# Patient Record
Sex: MALE | Race: WHITE | NOT HISPANIC OR LATINO | Employment: FULL TIME | URBAN - METROPOLITAN AREA
[De-identification: names, ages, dates, MRNs, and addresses within clinical notes are randomized per-mention and may not be internally consistent; named-entity substitution may affect disease eponyms.]

---

## 2020-01-15 ENCOUNTER — TRANSCRIBE ORDERS (OUTPATIENT)
Dept: CARDIOLOGY | Facility: CLINIC | Age: 40
End: 2020-01-15

## 2020-01-15 DIAGNOSIS — R94.39 ABNORMAL STRESS TEST: Primary | ICD-10-CM

## 2020-01-21 ENCOUNTER — HOSPITAL ENCOUNTER (OUTPATIENT)
Facility: HOSPITAL | Age: 40
Discharge: HOME | End: 2020-01-21
Attending: INTERNAL MEDICINE | Admitting: INTERNAL MEDICINE

## 2020-01-21 VITALS
HEIGHT: 73 IN | SYSTOLIC BLOOD PRESSURE: 106 MMHG | OXYGEN SATURATION: 97 % | TEMPERATURE: 97.8 F | WEIGHT: 200.62 LBS | BODY MASS INDEX: 26.59 KG/M2 | RESPIRATION RATE: 18 BRPM | DIASTOLIC BLOOD PRESSURE: 78 MMHG | HEART RATE: 80 BPM

## 2020-01-21 DIAGNOSIS — R94.39 ABNORMAL STRESS TEST: ICD-10-CM

## 2020-01-21 LAB
ALBUMIN SERPL-MCNC: 4.5 G/DL (ref 3.5–5.2)
ALBUMIN/GLOB SERPL: 1.7 G/DL
ALP SERPL-CCNC: 94 U/L (ref 39–117)
ALT SERPL W P-5'-P-CCNC: 22 U/L (ref 1–41)
ANION GAP SERPL CALCULATED.3IONS-SCNC: 10 MMOL/L (ref 5–15)
AST SERPL-CCNC: 20 U/L (ref 1–40)
BILIRUB SERPL-MCNC: 0.4 MG/DL (ref 0.2–1.2)
BUN BLD-MCNC: 7 MG/DL (ref 6–20)
BUN/CREAT SERPL: 5.9 (ref 7–25)
CALCIUM SPEC-SCNC: 9.5 MG/DL (ref 8.6–10.5)
CHLORIDE SERPL-SCNC: 99 MMOL/L (ref 98–107)
CHOLEST SERPL-MCNC: 212 MG/DL (ref 0–200)
CO2 SERPL-SCNC: 30 MMOL/L (ref 22–29)
CREAT BLD-MCNC: 1.18 MG/DL (ref 0.76–1.27)
DEPRECATED RDW RBC AUTO: 43.9 FL (ref 37–54)
ERYTHROCYTE [DISTWIDTH] IN BLOOD BY AUTOMATED COUNT: 12.9 % (ref 12.3–15.4)
GFR SERPL CREATININE-BSD FRML MDRD: 68 ML/MIN/1.73
GLOBULIN UR ELPH-MCNC: 2.7 GM/DL
GLUCOSE BLD-MCNC: 97 MG/DL (ref 65–99)
HBA1C MFR BLD: 5.5 % (ref 4.8–5.6)
HCT VFR BLD AUTO: 50.5 % (ref 37.5–51)
HDLC SERPL-MCNC: 41 MG/DL (ref 40–60)
HGB BLD-MCNC: 17.4 G/DL (ref 13–17.7)
LDLC SERPL CALC-MCNC: 140 MG/DL (ref 0–100)
LDLC/HDLC SERPL: 3.4 {RATIO}
MCH RBC QN AUTO: 32.5 PG (ref 26.6–33)
MCHC RBC AUTO-ENTMCNC: 34.5 G/DL (ref 31.5–35.7)
MCV RBC AUTO: 94.2 FL (ref 79–97)
PLATELET # BLD AUTO: 260 10*3/MM3 (ref 140–450)
PMV BLD AUTO: 9.2 FL (ref 6–12)
POTASSIUM BLD-SCNC: 4.2 MMOL/L (ref 3.5–5.2)
PROT SERPL-MCNC: 7.2 G/DL (ref 6–8.5)
RBC # BLD AUTO: 5.36 10*6/MM3 (ref 4.14–5.8)
SODIUM BLD-SCNC: 139 MMOL/L (ref 136–145)
TRIGL SERPL-MCNC: 157 MG/DL (ref 0–150)
VLDLC SERPL-MCNC: 31.4 MG/DL
WBC NRBC COR # BLD: 7.93 10*3/MM3 (ref 3.4–10.8)

## 2020-01-21 PROCEDURE — 83036 HEMOGLOBIN GLYCOSYLATED A1C: CPT | Performed by: NURSE PRACTITIONER

## 2020-01-21 PROCEDURE — 80053 COMPREHEN METABOLIC PANEL: CPT | Performed by: NURSE PRACTITIONER

## 2020-01-21 PROCEDURE — S0260 H&P FOR SURGERY: HCPCS | Performed by: INTERNAL MEDICINE

## 2020-01-21 PROCEDURE — 0 IOPAMIDOL PER 1 ML: Performed by: INTERNAL MEDICINE

## 2020-01-21 PROCEDURE — 93458 L HRT ARTERY/VENTRICLE ANGIO: CPT | Performed by: INTERNAL MEDICINE

## 2020-01-21 PROCEDURE — 25010000002 MIDAZOLAM PER 1 MG: Performed by: INTERNAL MEDICINE

## 2020-01-21 PROCEDURE — 25010000002 HEPARIN (PORCINE) PER 1000 UNITS: Performed by: INTERNAL MEDICINE

## 2020-01-21 PROCEDURE — 85027 COMPLETE CBC AUTOMATED: CPT | Performed by: NURSE PRACTITIONER

## 2020-01-21 PROCEDURE — 80061 LIPID PANEL: CPT | Performed by: NURSE PRACTITIONER

## 2020-01-21 PROCEDURE — 25010000002 FENTANYL CITRATE (PF) 100 MCG/2ML SOLUTION: Performed by: INTERNAL MEDICINE

## 2020-01-21 PROCEDURE — C1894 INTRO/SHEATH, NON-LASER: HCPCS | Performed by: INTERNAL MEDICINE

## 2020-01-21 PROCEDURE — C1769 GUIDE WIRE: HCPCS | Performed by: INTERNAL MEDICINE

## 2020-01-21 PROCEDURE — 36415 COLL VENOUS BLD VENIPUNCTURE: CPT

## 2020-01-21 RX ORDER — ONDANSETRON 2 MG/ML
4 INJECTION INTRAMUSCULAR; INTRAVENOUS EVERY 6 HOURS PRN
Status: DISCONTINUED | OUTPATIENT
Start: 2020-01-21 | End: 2020-01-21 | Stop reason: HOSPADM

## 2020-01-21 RX ORDER — LIDOCAINE HYDROCHLORIDE 10 MG/ML
INJECTION, SOLUTION EPIDURAL; INFILTRATION; INTRACAUDAL; PERINEURAL AS NEEDED
Status: DISCONTINUED | OUTPATIENT
Start: 2020-01-21 | End: 2020-01-21 | Stop reason: HOSPADM

## 2020-01-21 RX ORDER — MIDAZOLAM HYDROCHLORIDE 1 MG/ML
INJECTION INTRAMUSCULAR; INTRAVENOUS AS NEEDED
Status: DISCONTINUED | OUTPATIENT
Start: 2020-01-21 | End: 2020-01-21 | Stop reason: HOSPADM

## 2020-01-21 RX ORDER — FENTANYL CITRATE 50 UG/ML
INJECTION, SOLUTION INTRAMUSCULAR; INTRAVENOUS AS NEEDED
Status: DISCONTINUED | OUTPATIENT
Start: 2020-01-21 | End: 2020-01-21 | Stop reason: HOSPADM

## 2020-01-21 RX ORDER — ASPIRIN 325 MG
325 TABLET ORAL ONCE
Status: COMPLETED | OUTPATIENT
Start: 2020-01-21 | End: 2020-01-21

## 2020-01-21 RX ORDER — NITROGLYCERIN 0.4 MG/1
0.4 TABLET SUBLINGUAL
Status: DISCONTINUED | OUTPATIENT
Start: 2020-01-21 | End: 2020-01-21 | Stop reason: HOSPADM

## 2020-01-21 RX ORDER — ASPIRIN 81 MG/1
81 TABLET ORAL DAILY
COMMUNITY

## 2020-01-21 RX ORDER — ACETAMINOPHEN 325 MG/1
650 TABLET ORAL EVERY 4 HOURS PRN
Status: DISCONTINUED | OUTPATIENT
Start: 2020-01-21 | End: 2020-01-21 | Stop reason: HOSPADM

## 2020-01-21 RX ORDER — FAMOTIDINE 20 MG/1
20 TABLET, FILM COATED ORAL DAILY
COMMUNITY

## 2020-01-21 RX ORDER — METOPROLOL SUCCINATE 50 MG/1
50 TABLET, EXTENDED RELEASE ORAL DAILY
COMMUNITY

## 2020-01-21 RX ORDER — SODIUM CHLORIDE 0.9 % (FLUSH) 0.9 %
3 SYRINGE (ML) INJECTION EVERY 12 HOURS SCHEDULED
Status: DISCONTINUED | OUTPATIENT
Start: 2020-01-21 | End: 2020-01-21 | Stop reason: HOSPADM

## 2020-01-21 RX ORDER — SODIUM CHLORIDE 0.9 % (FLUSH) 0.9 %
10 SYRINGE (ML) INJECTION AS NEEDED
Status: DISCONTINUED | OUTPATIENT
Start: 2020-01-21 | End: 2020-01-21 | Stop reason: HOSPADM

## 2020-01-21 RX ORDER — DILTIAZEM HYDROCHLORIDE 120 MG/1
120 CAPSULE, EXTENDED RELEASE ORAL DAILY
COMMUNITY

## 2020-01-21 RX ORDER — ASPIRIN 325 MG
325 TABLET, DELAYED RELEASE (ENTERIC COATED) ORAL DAILY
Status: DISCONTINUED | OUTPATIENT
Start: 2020-01-22 | End: 2020-01-21 | Stop reason: HOSPADM

## 2020-01-21 RX ORDER — DIGOXIN 125 MCG
125 TABLET ORAL
COMMUNITY

## 2020-01-21 RX ADMIN — ASPIRIN 325 MG ORAL TABLET 325 MG: 325 PILL ORAL at 10:08

## 2020-01-21 NOTE — H&P
[facility] - Cardiology History & Physical     Demario Briseno  1980  2528/1      PCP:  Provider, No Known    Demario Briseno is a 39 y.o.  male.    01/21/20    Chief Complaint: Abnormal stress test    PROBLEM LIST/PMHx:  1.  Abnormal Stress Echo   A.  Presents with Chest Pain, AFib with RVR  11/2019   B.  TTE 11/13/19:  EF 58%, LA 3.8 cm, RVSP < 35mmHg.  No valvular abnormalities.   C.  Stress Echo 11/15/2019:  EF > 60%, apical/mid anterior hypokinesis.   3.  Paroxysmal Atrial Fibrillation   A.  CHADS2 Vasc = 0.   B.  On Eliquis 5mg BID.  4.  Ongoing smoking Tobacco Abuse  5.  Family History CAD, father.  6.  Persistent nausea/emesis  7.  UDS + Opiates.    Allergies:  has No Known Allergies.    Medications Prior to Admission   Medication Sig Dispense Refill Last Dose   • apixaban (ELIQUIS) 5 MG tablet tablet Take 5 mg by mouth 2 (Two) Times a Day.   1/18/2020   • aspirin 81 MG EC tablet Take 81 mg by mouth Daily.   1/18/2020   • digoxin (LANOXIN) 125 MCG tablet Take 125 mcg by mouth Daily.   1/21/2020 at Unknown time   • dilTIAZem XR (DILACOR XR) 120 MG 24 hr capsule Take 120 mg by mouth Daily.   1/21/2020 at Unknown time   • famotidine (PEPCID) 20 MG tablet Take 20 mg by mouth Daily.   1/21/2020 at Unknown time   • metoprolol succinate XL (TOPROL-XL) 50 MG 24 hr tablet Take 50 mg by mouth Daily.   1/21/2020 at Unknown time   • Multiple Vitamins-Minerals (MULTIVITAMIN ADULT PO) Take  by mouth Daily.   1/21/2020 at Unknown time         CARDIAC RISK FACTORS:   family history of premature cardiovascular disease, male gender, sedentary lifestyle and smoking/ tobacco exposure.     HPI:  Demario Briseno is a 38 yo CM with a PMHx of smoking tobacco abuse who presents to his local hospital 11/2019 with complaints of chest pain, nausea/emesis, generalized malaise.  He was found to be in Atrial Fibrillation with RVR at that time and was admitted. He ruled out for MI.  He underwent TTE  "and stress echo - stress echo demonstrates apical/anterior hypokinesis, Nl EF.  By report, he did not have insurance at that time and could not afford [facility] which was recommended.  He was placed on Cardizem, Digoxin and Eliquis for his AFib.  No attempt has been made to attain NSR.  He presents in follow up with continued AFib with controlled rates.  He continues to have nausea with frequent episodes of emesis.  He has not missed any doses of Eliquis and denies any bleeding or bruising issues.  He is here today (now with insurance) for LHC.  When asked about ECV, he states they have yet to attempt this.  He would be interested in getting back in NSR today if possible.          Social History     Socioeconomic History   • Marital status:      Spouse name: Not on file   • Number of children: Not on file   • Years of education: Not on file   • Highest education level: Not on file   Tobacco Use   • Smoking status: Current Every Day Smoker     Packs/day: 0.50     Types: Cigarettes   • Smokeless tobacco: Never Used   Substance and Sexual Activity   • Alcohol use: Not Currently     Frequency: Never   • Drug use: Never     History reviewed. No pertinent family history.  History reviewed. No pertinent surgical history.    Review of Systems:  Pertinent positives are listed above and in physical exam.  All others have been reviewed and are negative.     Objective:   Vitals:   height is 185.4 cm (73\") and weight is 91 kg (200 lb 9.9 oz). His temporal temperature is 97.8 °F (36.6 °C). His blood pressure is 120/94 and his pulse is 85. His oxygen saturation is 93%.  No intake or output data in the 24 hours ending 01/21/20 0958      Physical Exam:  General Appearance:    Alert, cooperative, in no acute distress   Head:    Normocephalic, without obvious abnormality, atraumatic   Eyes:            Lids and lashes normal, conjunctivae and sclerae normal, no   icterus, no pallor, corneas clear, PERRLA   Ears:    Ears appear intact " with no abnormalities noted   Throat:   No oral lesions, no thrush, oral mucosa moist   Neck:   No adenopathy, supple, trachea midline, no thyromegaly, no     carotid bruit, no JVD   Back:     No kyphosis present, no scoliosis present, no skin lesions,       erythema or scars, no tenderness to percussion or                   palpation,   range of motion normal   Lungs:     Clear to auscultation,respirations regular, even and                   unlabored    Heart:    Regular rhythm and normal rate, normal S1 and S2, no            murmur, no gallop, no rub, no click       Abdomen:     Normal bowel sounds, no masses, no organomegaly, soft        non-tender, non-distended, no guarding, no rebound                 tenderness       Extremities:   Moves all extremities well,  no cyanosis, no redness, no edema   Pulses:   Pulses palpable and equal bilaterally   Skin:   No bleeding, bruising or rash.  Multiple tattoos.   Lymph nodes:   No palpable adenopathy   Neurologic:   Cranial nerves 2 - 12 grossly intact, sensation intact, DTR        present and equal bilaterally          Results Review:  I personally reviewed the patient's clinical results.  Results from last 7 days   Lab Units 01/21/20  0930   WBC 10*3/mm3 7.93   HEMOGLOBIN g/dL 17.4   HEMATOCRIT % 50.5   PLATELETS 10*3/mm3 260           Invalid input(s): LABALBU, PROT                        Radiology:  Imaging Results (Last 72 Hours)     ** No results found for the last 72 hours. **          Tele:  Atrial Fibrillation    Assessment and Plan:    1.  Abnormal Stress Echo   -  Symptoms of fatigue and chest pain since November with abnormal stress echo, Nl EF   -  Patient is here today for LHC +/- PCI, Dr. Celaya.  Risks and benefits reviewed with patient and he is willing to proceed.  Further recommendations based on outcomes.   -  Last dose of Eliquis Saturday pm.      2.  Paroxysmal Atrial Fibrillation   -  CHADS2 Vasc 0   -  Has been on Eliquis since 11/2019.   However, has held Eliquis since Saturday.    -  Would advise ECV in 3 weeks with primary cardiologist.    3.  Unknown lipid status   -  Draw lipid panel    4.  Tobacco Abuse   -  Cessation education to be provided.   -  At risk for readmission    I discussed the patients findings and my recommendations with the patient, any present family members, and the nursing staff.  Ruddy Celaya MD saw and examined patient, verified hx and PE, read all radiographic studies, reviewed labs and micro data, and formulated dx, plan for treatment and all medical decision making.      MAYANK WalkerC for Ruddy Celaya MD  01/21/20 9:58 AM  Electronically signed by JACEK Fenton, 01/21/20, 10:26 AM.

## 2023-06-01 ENCOUNTER — OFFICE VISIT (OUTPATIENT)
Dept: CARDIOLOGY | Facility: CLINIC | Age: 43
End: 2023-06-01
Payer: MEDICAID

## 2023-06-01 VITALS
BODY MASS INDEX: 24.39 KG/M2 | OXYGEN SATURATION: 98 % | WEIGHT: 184 LBS | HEART RATE: 70 BPM | HEIGHT: 73 IN | SYSTOLIC BLOOD PRESSURE: 140 MMHG | DIASTOLIC BLOOD PRESSURE: 90 MMHG

## 2023-06-01 DIAGNOSIS — G47.19 OTHER HYPERSOMNIA: ICD-10-CM

## 2023-06-01 DIAGNOSIS — R07.2 PRECORDIAL CHEST PAIN: Primary | ICD-10-CM

## 2023-06-01 DIAGNOSIS — I48.0 PAROXYSMAL ATRIAL FIBRILLATION: ICD-10-CM

## 2023-06-01 RX ORDER — METHADONE HYDROCHLORIDE 10 MG/1
10 TABLET ORAL EVERY 6 HOURS PRN
COMMUNITY

## 2023-06-01 NOTE — PROGRESS NOTES
[facility] Provider Note     Date:   2023   Name: Demario Briseno  :   1980  PCP: Provider, No Known    Chief Complaint   Patient presents with   • Establish Care       Subjective     History of Present Illness  Demario Briseno is a 42 y.o. male who presents today for reestablishing care.  He has had a history of atrial fibrillation previously status post cardioversion.  He has been feeling well until about the last month.  He started having palpitations where he felt like his heart was beating like in A-fib again.  It lasted about 5 hours and then he went and lay down to sleep.  It also gave him chest tightness.  He has had chest tightness other times as well like when he was mowing the yard.  He does have a history of opiate use and had relapsed about a year and a half ago.  He did get into rehab and has been on methadone for about the last year.  No IV drug use.  He has been sober.  His main complaints are the chest pain palpitations and excessive fatigue.  He is extremely tired.    Cardiology/sleep history  1.  Paroxysmal atrial fibrillation status post cardioversion 2022  2.  LHC 1/15/2020 normal coronary arteries  3.  Echocardiogram 2019 EF 50%      No Known Allergies    Current Outpatient Medications:   •  methadone (DOLOPHINE) 10 MG tablet, Take 1 tablet by mouth Every 6 (Six) Hours As Needed for Moderate Pain,, Disp: , Rfl:     Past Medical History:   Diagnosis Date   • Asthma    • Atrial fibrillation       Past Surgical History:   Procedure Laterality Date   • CARDIAC CATHETERIZATION N/A 2020    Procedure: Left Heart Cath;  Surgeon: Ruddy Celaya MD;  [facility];  Service: Cardiovascular     Family History   Problem Relation Age of Onset   • Hypertension Mother    • Thyroid disease Mother    • Dementia Father    • Hypertension Father      Social History     Socioeconomic History   • Marital status:   "  Tobacco Use   • Smoking status: Every Day     Packs/day: 0.50     Years: 25.00     Pack years: 12.50     Types: Cigarettes     Passive exposure: Current   • Smokeless tobacco: Never   Vaping Use   • Vaping Use: Never used   Substance and Sexual Activity   • Alcohol use: Not Currently   • Drug use: Never   • Sexual activity: Defer       Objective     Vital Signs:  /90   Pulse 70   Ht 185.4 cm (73\")   Wt 83.5 kg (184 lb)   SpO2 98%   BMI 24.28 kg/m²   Estimated body mass index is 24.28 kg/m² as calculated from the following:    Height as of this encounter: 185.4 cm (73\").    Weight as of this encounter: 83.5 kg (184 lb).       BMI is within normal parameters. No other follow-up for BMI required.      Physical Exam  Vitals reviewed.   Constitutional:       General: He is not in acute distress.     Appearance: Normal appearance.   HENT:      Head: Normocephalic and atraumatic.      Mouth/Throat:      Mouth: Mucous membranes are moist.   Eyes:      Conjunctiva/sclera: Conjunctivae normal.   Neck:      Vascular: No carotid bruit.   Cardiovascular:      Rate and Rhythm: Normal rate and regular rhythm.      Pulses: Normal pulses.      Heart sounds: Normal heart sounds. No murmur heard.  Pulmonary:      Effort: Pulmonary effort is normal. No respiratory distress.      Breath sounds: Normal breath sounds. No wheezing or rhonchi.   Abdominal:      General: Abdomen is flat.      Palpations: Abdomen is soft.   Musculoskeletal:      Cervical back: Normal range of motion and neck supple.      Right lower leg: No edema.      Left lower leg: No edema.   Skin:     General: Skin is warm and dry.      Coloration: Skin is not jaundiced.   Neurological:      General: No focal deficit present.      Mental Status: He is alert and oriented to person, place, and time. Mental status is at baseline.      GCS: GCS eye subscore is 4. GCS verbal subscore is 5. GCS motor subscore is 6.      Cranial Nerves: No cranial nerve deficit.    "   Motor: No weakness.      Gait: Gait normal.   Psychiatric:         Mood and Affect: Mood and affect normal. Mood is not anxious.         Speech: Speech normal.         Behavior: Behavior normal.               ECG 12 Lead    Date/Time: 6/1/2023 1:44 PM  Performed by: Marika Simpson MD  Authorized by: Marika Simpson MD   Rhythm: sinus rhythm  Rate: normal  Conduction: left anterior fascicular block  T inversion: II, III, V4, V5, V6 and V3  Other findings: non-specific ST-T wave changes    Clinical impression: abnormal EKG         Assessment and Plan     Diagnoses and all orders for this visit:    1. Precordial chest pain (Primary)  Comments:  Concerning for angina or atrial fibrillation.  Prior heart catheterization reviewed.  Plan nuclear stress testing.  Went into A-fib on treadmill last time.  Orders:  -     Holter Monitor - 72 Hour Up To 15 Days  -     Adult Transthoracic Echo Complete W/ Cont if Necessary Per Protocol; Future  -     Stress Test With Myocardial Perfusion One Day; Future  -     ECG 12 Lead    2. Other hypersomnia  Comments:  Cloudcroft scale 11.  Patient also working harder lately.  Trial sleep study.  Orders:  -     Home Sleep Study; Future    3. Paroxysmal atrial fibrillation  Comments:  Suspect this is returned.  Watch on monitor.  Orders:  -     Holter Monitor - 72 Hour Up To 15 Days  -     Adult Transthoracic Echo Complete W/ Cont if Necessary Per Protocol; Future  -     Stress Test With Myocardial Perfusion One Day; Future  -     ECG 12 Lead    Other orders  -     SCANNED EKG        Recommendations: ER if symptoms increase, Report if any new/changing symptoms immediately, Sleep risks reviewed (driving, medical, sleep death, sedating agents), and Sleep hygiene discussed          Follow Up  Return in about 2 months (around 8/1/2023) for results.  Patient was given instructions and counseling regarding his condition or for health maintenance advice. Please see specific information pulled  into the AVS if appropriate.

## 2023-06-05 PROBLEM — I48.0 PAROXYSMAL ATRIAL FIBRILLATION: Status: ACTIVE | Noted: 2023-06-05

## 2023-06-05 PROBLEM — R07.2 PRECORDIAL CHEST PAIN: Status: ACTIVE | Noted: 2023-06-05

## 2023-06-05 PROBLEM — G47.19 OTHER HYPERSOMNIA: Status: ACTIVE | Noted: 2023-06-05

## 2023-07-21 ENCOUNTER — OUTSIDE FACILITY SERVICE (OUTPATIENT)
Dept: CARDIOLOGY | Facility: CLINIC | Age: 43
End: 2023-07-21
Payer: MEDICAID

## 2023-08-03 ENCOUNTER — OFFICE VISIT (OUTPATIENT)
Dept: CARDIOLOGY | Facility: CLINIC | Age: 43
End: 2023-08-03
Payer: MEDICAID

## 2023-08-03 VITALS
HEIGHT: 73 IN | SYSTOLIC BLOOD PRESSURE: 138 MMHG | WEIGHT: 190 LBS | DIASTOLIC BLOOD PRESSURE: 90 MMHG | OXYGEN SATURATION: 98 % | HEART RATE: 64 BPM | BODY MASS INDEX: 25.18 KG/M2

## 2023-08-03 DIAGNOSIS — G47.33 OSA (OBSTRUCTIVE SLEEP APNEA): ICD-10-CM

## 2023-08-03 DIAGNOSIS — I48.0 PAROXYSMAL ATRIAL FIBRILLATION: ICD-10-CM

## 2023-08-03 DIAGNOSIS — R07.2 PRECORDIAL CHEST PAIN: Primary | ICD-10-CM

## 2023-08-03 PROCEDURE — 99214 OFFICE O/P EST MOD 30 MIN: CPT | Performed by: INTERNAL MEDICINE

## 2023-08-03 NOTE — PROGRESS NOTES
[facility] Provider Note     Date:   2023   Name: Demario Briseno  :   1980  PCP: Provider, No Known    Chief Complaint   Patient presents with    Chest Pain     Echo/Ciara/Holter Results       Subjective     History of Present Illness  Demario Briseno is a 42 y.o. male who presents today for follow-up on palpitations and chest pain.  He says his chest pain has not really been bad at all and he has not had any episodes since his last appointment.  He has had a couple of episodes of palpitations that have just lasted 1 to 5 minutes in duration.  Not nearly as bad as his last appointment.  His Holter did not show any recurrent A-fib.  He denies any lower extremity edema.  He feels like he is otherwise doing well.  In terms of CPAP he does not want to use this.  He understands the risks of untreated sleep apnea but he thinks he would not be able to sleep with something on his face.    Cardiology/sleep history  1.  Paroxysmal atrial fibrillation status post cardioversion 2022  2.  LHC 1/15/2020 normal coronary arteries  3.  Echocardiogram 2019 EF 50%      No Known Allergies    Current Outpatient Medications:     methadone (DOLOPHINE) 10 MG tablet, Take 1 tablet by mouth Every 6 (Six) Hours As Needed for Moderate Pain,, Disp: , Rfl:     Past Medical History:   Diagnosis Date    Asthma     Atrial fibrillation       Past Surgical History:   Procedure Laterality Date    CARDIAC CATHETERIZATION N/A 2020    Procedure: Left Heart Cath;  Surgeon: Ruddy Celaya MD;  Location: [facility] CATH INVASIVE LOCATION;  Service: Cardiovascular     Family History   Problem Relation Age of Onset    Hypertension Mother     Thyroid disease Mother     Dementia Father     Hypertension Father      Social History     Socioeconomic History    Marital status:    Tobacco Use    Smoking status: Every Day     Packs/day: 0.50     Years: 25.00     Pack years: 12.50     Types: Cigarettes  "    Passive exposure: Current    Smokeless tobacco: Never   Vaping Use    Vaping Use: Never used   Substance and Sexual Activity    Alcohol use: Not Currently    Drug use: Never    Sexual activity: Defer       Objective     Vital Signs:  /90   Pulse 64   Ht 185.4 cm (73\")   Wt 86.2 kg (190 lb)   SpO2 98%   BMI 25.07 kg/mý   Estimated body mass index is 25.07 kg/mý as calculated from the following:    Height as of this encounter: 185.4 cm (73\").    Weight as of this encounter: 86.2 kg (190 lb).             Physical Exam  Constitutional:       Appearance: Normal appearance. He is well-developed.   HENT:      Head: Normocephalic and atraumatic.   Eyes:      General: No scleral icterus.     Pupils: Pupils are equal, round, and reactive to light.   Neck:      Vascular: No carotid bruit.   Cardiovascular:      Rate and Rhythm: Normal rate and regular rhythm.      Pulses: Normal pulses.           Radial pulses are 2+ on the right side and 2+ on the left side.        Dorsalis pedis pulses are 2+ on the right side and 2+ on the left side.        Posterior tibial pulses are 2+ on the right side and 2+ on the left side.      Heart sounds: Normal heart sounds. No murmur heard.  Pulmonary:      Breath sounds: Normal breath sounds. No wheezing or rhonchi.   Musculoskeletal:      Right lower leg: No edema.      Left lower leg: No edema.   Skin:     Capillary Refill: Capillary refill takes less than 2 seconds.      Coloration: Skin is not cyanotic.      Nails: There is no clubbing.   Neurological:      Mental Status: He is alert and oriented to person, place, and time.      Motor: No weakness.      Gait: Gait normal.   Psychiatric:         Mood and Affect: Mood normal.         Behavior: Behavior is cooperative.         Thought Content: Thought content normal.         Cognition and Memory: Memory normal.                   Assessment and Plan     Diagnoses and all orders for this visit:    1. Precordial chest pain " (Primary)  Comments:  Better lately.  Low risk stress test.  Follow-up to ensure stability.    2. Paroxysmal atrial fibrillation  Comments:  No recent episodes.  Monitor showed no significant arrhythmias.  Palpitations better in terms of frequency and duration.    3. ANITHA (obstructive sleep apnea)  Comments:  Declines Pap therapy.        Recommendations: Report if any new/changing symptoms immediately, Sleep risks reviewed (driving, medical, sleep death, sedating agents), and Sleep hygiene discussed          Follow Up  Return in about 3 months (around 11/3/2023) for Recheck symptoms.  Patient was given instructions and counseling regarding his condition or for health maintenance advice. Please see specific information pulled into the AVS if appropriate.

## 2024-07-31 ENCOUNTER — TELEPHONE (OUTPATIENT)
Dept: CARDIOLOGY | Facility: CLINIC | Age: 44
End: 2024-07-31

## 2024-07-31 NOTE — TELEPHONE ENCOUNTER
Caller: [place]She[place]on gacy    Relationship: Emergency Contact    Best call back number: [phone]     What was the call regarding: PT HAD FOLLOW APPT ON 7/31 AND DUE TO UNFORSEEN CIRCUMSTANCES, THEY HAD TO RESCHEDULE. NEXT AVAILABLE WAS  9/16. JUST WANTED TO MAKE OFFICE AWARE.  APPT NOTES: FU FOR AFIB-REFERRAL FROM SAVANNAH FOSTER  PCP NOTE AND LABS IN MEDIA AND NO RECENT ER VISIT.  LAST VISIT 08.23

## (undated) DEVICE — CATH DIAG EXPO .045 FL3.5 5F 100CM

## (undated) DEVICE — CATH DIAG EXPO M/ PK 6FR FL4/FR4 PIG 3PK

## (undated) DEVICE — DEV COMP RAD PRELUDESYNC 24CM

## (undated) DEVICE — MODEL BT2000 P/N 700287-012KIT CONTENTS: MANIFOLD WITH SALINE AND CONTRAST PORTS, SALINE TUBING WITH SPIKE AND HAND SYRINGE, TRANSDUCER: Brand: BT2000 AUTOMATED MANIFOLD KIT

## (undated) DEVICE — PK CATH CARD 10

## (undated) DEVICE — GLIDESHEATH BASIC HYDROPHILIC COATED INTRODUCER SHEATH: Brand: GLIDESHEATH

## (undated) DEVICE — MODEL AT P65, P/N 701554-001KIT CONTENTS: HAND CONTROLLER, 3-WAY HIGH-PRESSURE STOPCOCK WITH ROTATING END AND PREMIUM HIGH-PRESSURE TUBING: Brand: ANGIOTOUCH® KIT

## (undated) DEVICE — Device